# Patient Record
Sex: FEMALE | Race: WHITE
[De-identification: names, ages, dates, MRNs, and addresses within clinical notes are randomized per-mention and may not be internally consistent; named-entity substitution may affect disease eponyms.]

---

## 2020-06-13 ENCOUNTER — HOSPITAL ENCOUNTER (INPATIENT)
Dept: HOSPITAL 96 - M.ERS | Age: 80
LOS: 5 days | Discharge: HOME HEALTH | DRG: 392 | End: 2020-06-18
Attending: FAMILY MEDICINE | Admitting: FAMILY MEDICINE
Payer: MEDICARE

## 2020-06-13 VITALS — DIASTOLIC BLOOD PRESSURE: 85 MMHG | SYSTOLIC BLOOD PRESSURE: 144 MMHG

## 2020-06-13 VITALS — BODY MASS INDEX: 34.54 KG/M2 | WEIGHT: 194.9 LBS | HEIGHT: 62.99 IN

## 2020-06-13 VITALS — DIASTOLIC BLOOD PRESSURE: 77 MMHG | SYSTOLIC BLOOD PRESSURE: 156 MMHG

## 2020-06-13 VITALS — DIASTOLIC BLOOD PRESSURE: 71 MMHG | SYSTOLIC BLOOD PRESSURE: 156 MMHG

## 2020-06-13 VITALS — SYSTOLIC BLOOD PRESSURE: 162 MMHG | DIASTOLIC BLOOD PRESSURE: 71 MMHG

## 2020-06-13 DIAGNOSIS — Z88.1: ICD-10-CM

## 2020-06-13 DIAGNOSIS — Z88.8: ICD-10-CM

## 2020-06-13 DIAGNOSIS — K59.00: ICD-10-CM

## 2020-06-13 DIAGNOSIS — E86.0: ICD-10-CM

## 2020-06-13 DIAGNOSIS — Z79.82: ICD-10-CM

## 2020-06-13 DIAGNOSIS — E78.5: ICD-10-CM

## 2020-06-13 DIAGNOSIS — B96.1: ICD-10-CM

## 2020-06-13 DIAGNOSIS — E11.9: ICD-10-CM

## 2020-06-13 DIAGNOSIS — E03.9: ICD-10-CM

## 2020-06-13 DIAGNOSIS — I25.10: ICD-10-CM

## 2020-06-13 DIAGNOSIS — Z79.84: ICD-10-CM

## 2020-06-13 DIAGNOSIS — I10: ICD-10-CM

## 2020-06-13 DIAGNOSIS — K58.0: Primary | ICD-10-CM

## 2020-06-13 DIAGNOSIS — N39.0: ICD-10-CM

## 2020-06-13 DIAGNOSIS — K42.9: ICD-10-CM

## 2020-06-13 DIAGNOSIS — Z88.0: ICD-10-CM

## 2020-06-13 DIAGNOSIS — Z90.49: ICD-10-CM

## 2020-06-13 DIAGNOSIS — Z79.899: ICD-10-CM

## 2020-06-13 LAB
ABSOLUTE BASOPHILS: 0.1 THOU/UL (ref 0–0.2)
ABSOLUTE EOSINOPHILS: 0.2 THOU/UL (ref 0–0.7)
ABSOLUTE MONOCYTES: 0.7 THOU/UL (ref 0–1.2)
ALBUMIN SERPL-MCNC: 3.5 G/DL (ref 3.4–5)
ALP SERPL-CCNC: 135 U/L (ref 46–116)
ALT SERPL-CCNC: 25 U/L (ref 30–65)
ANION GAP SERPL CALC-SCNC: 10 MMOL/L (ref 7–16)
APTT BLD: 26.4 SECONDS (ref 25–31.3)
AST SERPL-CCNC: 23 U/L (ref 15–37)
BASOPHILS NFR BLD AUTO: 0.8 %
BILIRUB SERPL-MCNC: 0.8 MG/DL
BUN SERPL-MCNC: 9 MG/DL (ref 7–18)
CALCIUM SERPL-MCNC: 9.6 MG/DL (ref 8.5–10.1)
CHLORIDE SERPL-SCNC: 103 MMOL/L (ref 98–107)
CO2 SERPL-SCNC: 26 MMOL/L (ref 21–32)
CREAT SERPL-MCNC: 1 MG/DL (ref 0.6–1.3)
EOSINOPHIL NFR BLD: 1.7 %
GLUCOSE SERPL-MCNC: 221 MG/DL (ref 70–99)
GRANULOCYTES NFR BLD MANUAL: 76.5 %
HCT VFR BLD CALC: 33.5 % (ref 37–47)
HGB BLD-MCNC: 11.7 GM/DL (ref 12–15)
INR PPP: 1.1
LYMPHOCYTES # BLD: 1.5 THOU/UL (ref 0.8–5.3)
LYMPHOCYTES NFR BLD AUTO: 14.2 %
MCH RBC QN AUTO: 30.1 PG (ref 26–34)
MCHC RBC AUTO-ENTMCNC: 34.9 G/DL (ref 28–37)
MCV RBC: 86.3 FL (ref 80–100)
MONOCYTES NFR BLD: 6.8 %
MPV: 8.4 FL. (ref 7.2–11.1)
NEUTROPHILS # BLD: 8 THOU/UL (ref 1.6–8.1)
NT-PRO BRAIN NAT PEPTIDE: 406 PG/ML (ref ?–300)
NUCLEATED RBCS: 0 /100WBC
PLATELET COUNT*: 212 THOU/UL (ref 150–400)
POTASSIUM SERPL-SCNC: 4.1 MMOL/L (ref 3.5–5.1)
PROT SERPL-MCNC: 6.8 G/DL (ref 6.4–8.2)
PROTHROMBIN TIME: 10.8 SECONDS (ref 9.2–11.5)
RBC # BLD AUTO: 3.89 MIL/UL (ref 4.2–5)
RDW-CV: 15.2 % (ref 10.5–14.5)
SODIUM SERPL-SCNC: 139 MMOL/L (ref 136–145)
WBC # BLD AUTO: 10.5 THOU/UL (ref 4–11)

## 2020-06-14 VITALS — SYSTOLIC BLOOD PRESSURE: 128 MMHG | DIASTOLIC BLOOD PRESSURE: 59 MMHG

## 2020-06-14 VITALS — SYSTOLIC BLOOD PRESSURE: 141 MMHG | DIASTOLIC BLOOD PRESSURE: 61 MMHG

## 2020-06-14 VITALS — DIASTOLIC BLOOD PRESSURE: 59 MMHG | SYSTOLIC BLOOD PRESSURE: 152 MMHG

## 2020-06-14 VITALS — DIASTOLIC BLOOD PRESSURE: 58 MMHG | SYSTOLIC BLOOD PRESSURE: 151 MMHG

## 2020-06-14 VITALS — DIASTOLIC BLOOD PRESSURE: 61 MMHG | SYSTOLIC BLOOD PRESSURE: 149 MMHG

## 2020-06-14 LAB
ALBUMIN SERPL-MCNC: 2.8 G/DL (ref 3.4–5)
ANION GAP SERPL CALC-SCNC: 8 MMOL/L (ref 7–16)
BUN SERPL-MCNC: 9 MG/DL (ref 7–18)
CALCIUM SERPL-MCNC: 9.2 MG/DL (ref 8.5–10.1)
CHLORIDE SERPL-SCNC: 107 MMOL/L (ref 98–107)
CO2 SERPL-SCNC: 25 MMOL/L (ref 21–32)
CREAT SERPL-MCNC: 0.9 MG/DL (ref 0.6–1.3)
GLUCOSE SERPL-MCNC: 83 MG/DL (ref 70–99)
HCT VFR BLD CALC: 30.4 % (ref 37–47)
HGB BLD-MCNC: 10.5 GM/DL (ref 12–15)
MAGNESIUM SERPL-MCNC: 1.9 MG/DL (ref 1.8–2.4)
MCH RBC QN AUTO: 29.8 PG (ref 26–34)
MCHC RBC AUTO-ENTMCNC: 34.6 G/DL (ref 28–37)
MCV RBC: 86.2 FL (ref 80–100)
MPV: 8.9 FL. (ref 7.2–11.1)
PHOSPHATE SERPL-MCNC: 2.9 MG/DL (ref 2.5–4.9)
PLATELET COUNT*: 205 THOU/UL (ref 150–400)
POTASSIUM SERPL-SCNC: 3.7 MMOL/L (ref 3.5–5.1)
RBC # BLD AUTO: 3.52 MIL/UL (ref 4.2–5)
RDW-CV: 15.2 % (ref 10.5–14.5)
SODIUM SERPL-SCNC: 140 MMOL/L (ref 136–145)
WBC # BLD AUTO: 9.3 THOU/UL (ref 4–11)

## 2020-06-14 NOTE — NUR
PT UP TO [facility] SEVERAL TIME COMPLAINING OF "SWOLLEN BOWEL PAIN". PT TOLERATED
CLEAR LIQUID DIET. WILL CONTINUE TO ASSESS.

## 2020-06-14 NOTE — NUR
PT ALERT, ORIENTED, SLOW RESPONCE, FORGETFUL. INITAL ASSESMENT PT NOTED TO
HAVE TREMORS. TELEMETRY SHOWS SR. TREMORS INTERFERE WITH TELEMETRY. UP TO BSC
WITH ASSIST OF TWO 5 TIMES. NO BM SINCE ADMISSION. BUTTOCKS SLIGHTLY RED
BLANCHABLE. PT REQUESTED ANXIETY MEDS AND SLEEPING MEDICATION. XANAX AND
MELATONIN GIVEN. PT CALLING OUT Q 15 MIN FOR VARIOUS THINGS UNTIL ASLEEP. WCTM

## 2020-06-14 NOTE — NUR
WORKED WITH PT THROUGHOUT THE DAY GETTING UP TO [facility] AND AMBULATING. BY THE
END OF THE SHIFT PT WAS ABLE TO GET TO BEDSIDE KAMODE BY HERSELF AS WELL AS
WIPE HER BOTTOM AND AMBULATE TO THE SINK TO WASH HER HANDS BY HERSELF. PT
NEEDED
MOSTLY ENCOURAGEMENT. PT PROGRESSING TOWARDS GOALS.

## 2020-06-15 VITALS — SYSTOLIC BLOOD PRESSURE: 163 MMHG | DIASTOLIC BLOOD PRESSURE: 75 MMHG

## 2020-06-15 VITALS — DIASTOLIC BLOOD PRESSURE: 67 MMHG | SYSTOLIC BLOOD PRESSURE: 167 MMHG

## 2020-06-15 VITALS — DIASTOLIC BLOOD PRESSURE: 71 MMHG | SYSTOLIC BLOOD PRESSURE: 163 MMHG

## 2020-06-15 VITALS — DIASTOLIC BLOOD PRESSURE: 70 MMHG | SYSTOLIC BLOOD PRESSURE: 165 MMHG

## 2020-06-15 VITALS — DIASTOLIC BLOOD PRESSURE: 68 MMHG | SYSTOLIC BLOOD PRESSURE: 170 MMHG

## 2020-06-15 LAB
BACTERIA-REFLEX: (no result) /HPF
BILIRUB UR-MCNC: NEGATIVE MG/DL
COLOR UR: YELLOW
KETONES UR STRIP-MCNC: NEGATIVE MG/DL
MUCUS: (no result) STRN/LPF
PROT UR QL STRIP: NEGATIVE
RBC # UR STRIP: (no result) /UL
RBC #/AREA URNS HPF: (no result) /HPF (ref 0–2)
SP GR UR STRIP: 1.01 (ref 1–1.03)
SQUAMOUS: (no result) /LPF (ref 0–3)
URINE CLARITY: (no result)
URINE GLUCOSE-RANDOM: NEGATIVE
URINE LEUKOCYTES-REFLEX: (no result)
URINE NITRITE-REFLEX: NEGATIVE
URINE WBC-REFLEX: (no result) /HPF (ref 0–5)
UROBILINOGEN UR STRIP-ACNC: 0.2 E.U./DL (ref 0.2–1)

## 2020-06-15 NOTE — NUR
CM spoke with Pt via phone. Pt resides at home with her dtr. Pt reports
getting weak over the past few days. Pt states that she is normally
independent, but states that her dtr has been having to help her with ADLs. Pt
completes IADLs. Pt is cdiff pending. Pt has a walker and cane at home for
mobility. Per , Pt has cdiff and parasite test pending. Therapies ordered.
OT recommending acute rehab, CM to await PT recs. Pt in agreement that she
will need either HH vs rehab at [place]. Following.

## 2020-06-15 NOTE — EKG
[place] [address]
Phone:  [phone]                     ELECTROCARDIOGRAM REPORT      
_______________________________________________________________________________
 
Name:         MIGUEL JACKSON             Room:          [address]    ADM IN 
M.R.#:    [id number]     Account #:     [id number]  
Admission:    20    Attend Phys:   Alicja leslie Sa
Discharge:                Date of Birth: [birth year]  
Date of Service: 20 1726  Report #:      [id number]
        [id number]
_______________________________________________________________________________
THIS REPORT FOR:  //name//                      
 
                         [facility] ED
                                       
Test Date:    2020               Test Time:    17:26:22
Pat Name:     MIGUEL JACKSON          Department:   
Patient ID:   [id number]            Room:         [facility]
Gender:       F                        Technician:   MARKIE
:          1940               Requested By: Vernon Pop
Order Number: [id number] MD:   Corona Dickens
                                 Measurements
Intervals                              Axis          
Rate:         80                       P:            
AZ:                                    QRS:          -52
QRSD:         116                      T:            79
QT:           360                                    
QTc:          416                                    
                           Interpretive Statements
sinus rhythm
Left anterior fascicular block
Left ventricular hypertrophy
 
Compared to ECG 2013 02:23:02
no change
 
Electronically Signed On 6- 14:50:08 CDT by Corona Dickens
[email or web address]
 
 
 
 
 
 
 
 
 
 
 
 
 
 
 
 
 
  <ELECTRONICALLY SIGNED>
                                           By: Corona Dickens MD, [facility]      
  06/15/20     1450
D: 20 1726   _____________________________________
T: 20 1726   Corona Dickens MD, [facility]        /EPI

## 2020-06-16 VITALS — DIASTOLIC BLOOD PRESSURE: 77 MMHG | SYSTOLIC BLOOD PRESSURE: 178 MMHG

## 2020-06-16 VITALS — SYSTOLIC BLOOD PRESSURE: 148 MMHG | DIASTOLIC BLOOD PRESSURE: 65 MMHG

## 2020-06-16 VITALS — SYSTOLIC BLOOD PRESSURE: 150 MMHG | DIASTOLIC BLOOD PRESSURE: 69 MMHG

## 2020-06-16 VITALS — DIASTOLIC BLOOD PRESSURE: 86 MMHG | SYSTOLIC BLOOD PRESSURE: 175 MMHG

## 2020-06-16 VITALS — DIASTOLIC BLOOD PRESSURE: 63 MMHG | SYSTOLIC BLOOD PRESSURE: 149 MMHG

## 2020-06-16 VITALS — SYSTOLIC BLOOD PRESSURE: 158 MMHG | DIASTOLIC BLOOD PRESSURE: 73 MMHG

## 2020-06-16 LAB
ALBUMIN SERPL-MCNC: 3.3 G/DL (ref 3.4–5)
ANION GAP SERPL CALC-SCNC: 12 MMOL/L (ref 7–16)
BUN SERPL-MCNC: 5 MG/DL (ref 7–18)
CALCIUM SERPL-MCNC: 9.6 MG/DL (ref 8.5–10.1)
CHLORIDE SERPL-SCNC: 103 MMOL/L (ref 98–107)
CO2 SERPL-SCNC: 22 MMOL/L (ref 21–32)
CREAT SERPL-MCNC: 0.8 MG/DL (ref 0.6–1.3)
GLUCOSE SERPL-MCNC: 122 MG/DL (ref 70–99)
HCT VFR BLD CALC: 34.5 % (ref 37–47)
HGB BLD-MCNC: 11.9 GM/DL (ref 12–15)
MAGNESIUM SERPL-MCNC: 1.9 MG/DL (ref 1.8–2.4)
MCH RBC QN AUTO: 29.8 PG (ref 26–34)
MCHC RBC AUTO-ENTMCNC: 34.6 G/DL (ref 28–37)
MCV RBC: 86.1 FL (ref 80–100)
MPV: 9 FL. (ref 7.2–11.1)
PHOSPHATE SERPL-MCNC: 3 MG/DL (ref 2.5–4.9)
PLATELET COUNT*: 240 THOU/UL (ref 150–400)
POTASSIUM SERPL-SCNC: 3.2 MMOL/L (ref 3.5–5.1)
RBC # BLD AUTO: 4.01 MIL/UL (ref 4.2–5)
RDW-CV: 15.3 % (ref 10.5–14.5)
SODIUM SERPL-SCNC: 137 MMOL/L (ref 136–145)
WBC # BLD AUTO: 10.7 THOU/UL (ref 4–11)

## 2020-06-17 VITALS — SYSTOLIC BLOOD PRESSURE: 158 MMHG | DIASTOLIC BLOOD PRESSURE: 77 MMHG

## 2020-06-17 VITALS — SYSTOLIC BLOOD PRESSURE: 153 MMHG | DIASTOLIC BLOOD PRESSURE: 61 MMHG

## 2020-06-17 VITALS — DIASTOLIC BLOOD PRESSURE: 49 MMHG | SYSTOLIC BLOOD PRESSURE: 139 MMHG

## 2020-06-17 VITALS — SYSTOLIC BLOOD PRESSURE: 160 MMHG | DIASTOLIC BLOOD PRESSURE: 78 MMHG

## 2020-06-17 VITALS — SYSTOLIC BLOOD PRESSURE: 111 MMHG | DIASTOLIC BLOOD PRESSURE: 75 MMHG

## 2020-06-17 LAB
ALBUMIN SERPL-MCNC: 2.9 G/DL (ref 3.4–5)
ANION GAP SERPL CALC-SCNC: 10 MMOL/L (ref 7–16)
ANION GAP SERPL CALC-SCNC: 9 MMOL/L (ref 7–16)
BUN SERPL-MCNC: 4 MG/DL (ref 7–18)
BUN SERPL-MCNC: 5 MG/DL (ref 7–18)
CALCIUM SERPL-MCNC: 8.8 MG/DL (ref 8.5–10.1)
CALCIUM SERPL-MCNC: 8.9 MG/DL (ref 8.5–10.1)
CHLORIDE SERPL-SCNC: 104 MMOL/L (ref 98–107)
CHLORIDE SERPL-SCNC: 105 MMOL/L (ref 98–107)
CO2 SERPL-SCNC: 25 MMOL/L (ref 21–32)
CO2 SERPL-SCNC: 25 MMOL/L (ref 21–32)
CREAT SERPL-MCNC: 0.9 MG/DL (ref 0.6–1.3)
CREAT SERPL-MCNC: 0.9 MG/DL (ref 0.6–1.3)
GLUCOSE SERPL-MCNC: 129 MG/DL (ref 70–99)
GLUCOSE SERPL-MCNC: 131 MG/DL (ref 70–99)
HCT VFR BLD CALC: 32.1 % (ref 37–47)
HGB BLD-MCNC: 11.1 GM/DL (ref 12–15)
MAGNESIUM SERPL-MCNC: 1.6 MG/DL (ref 1.8–2.4)
MCH RBC QN AUTO: 29.7 PG (ref 26–34)
MCHC RBC AUTO-ENTMCNC: 34.5 G/DL (ref 28–37)
MCV RBC: 86.2 FL (ref 80–100)
MPV: 9 FL. (ref 7.2–11.1)
PHOSPHATE SERPL-MCNC: 2.7 MG/DL (ref 2.5–4.9)
PLATELET COUNT*: 202 THOU/UL (ref 150–400)
POTASSIUM SERPL-SCNC: 3 MMOL/L (ref 3.5–5.1)
POTASSIUM SERPL-SCNC: 3 MMOL/L (ref 3.5–5.1)
RBC # BLD AUTO: 3.72 MIL/UL (ref 4.2–5)
RDW-CV: 15.3 % (ref 10.5–14.5)
SODIUM SERPL-SCNC: 139 MMOL/L (ref 136–145)
SODIUM SERPL-SCNC: 139 MMOL/L (ref 136–145)
WBC # BLD AUTO: 8 THOU/UL (ref 4–11)

## 2020-06-17 NOTE — EKG
[place] [address]
Phone:  [phone]                     ELECTROCARDIOGRAM REPORT      
_______________________________________________________________________________
 
Name:         LEATHA JACKSONARET LAVONNE             Room:          [address]    ADM IN 
M.R.#:    [id number]     Account #:     [id number]  
Admission:    20    Attend Phys:   Alicja leslie Sa
Discharge:                Date of Birth: [birth year]  
Date of Service: 20 1508  Report #:      [id number]
        [id number]
_______________________________________________________________________________
THIS REPORT FOR:  //name//                      
 
                          [facility]
                                       
Test Date:    2020               Test Time:    15:08:46
Pat Name:     MIGUEL JACKSON          Department:   
Patient ID:   [id number]            Room:         [address]
Gender:       F                        Technician:   DAMON
:          1940               Requested By: Alicja Tucker
Order Number: [id number]    Reading MD:   Corona Dickens
                                 Measurements
Intervals                              Axis          
Rate:         53                       P:            0
WY:           194                      QRS:          -45
QRSD:         119                      T:            81
QT:           435                                    
QTc:          409                                    
                           Interpretive Statements
Sinus bradycardia
LVH with IVCD, LAD and secondary repol abnrm
Compared to ECG 2020 17:26:22
 
 
no change
 
Electronically Signed On 2020 16:37:08 CDT by Corona Dickens
[email or web address]
 
 
 
 
 
 
 
 
 
 
 
 
 
 
 
 
 
  <ELECTRONICALLY SIGNED>
                                           By: Corona Dickens MD, [facility]      
  20     1637
D: 20 1508   _____________________________________
T: 20 1508   Corona Dickens MD, [facility]        /EPI

## 2020-06-17 NOTE — NUR
NO ACUTE CHANGES THROUGHOUT SHIFT, PT STARTED ON GOLYTELY AND HAS ONLY
FINISHED APPROXIMATELY 170 ML. EDUCATED PT ON WHY GOLYTELY IS NECESSARY FOR
PROCEDURE TOMORROW, PT STATES HER STOMACH FEELS UNCOMFORTABLE AND SHE'S HAVING
A HARD TIME DRINKING ANYMORE THAN THAT. MEDS PER MAR, HOURLY ROUNDING
OBSERVED, FALL PRECAUTIONS IN PLACE, CALL LIGHT W/IN REACH, WILL CONTINUE POC.

## 2020-06-17 NOTE — NUR
ASSUMED PT CARE AT 0730, PT RESTING IN BED, A&O AND C/O NO PAIN OR SHORTNESS
OF BREATH, JUST SOME ABD PAIN R/T WHAT PT BELIEVES TO BE CONSTIPATION. PT
ENDED UP HAVING MULTIPLE LOOSE WATERY STOOLS TODAY. PT REFUSES FLUIDS AND
POTASSIUM OR MAGNESIUM IN IV BECAUSE SHE STATES IT BURNS, ENCOURAGED HER TO
TAKE ORAL POTASSIUM AND MAG W/ SUCCESS AND DRINK MORE FLUIDS. PT CONSULTED W/
GI TODAY ABOUT EGD/COLON TOMORROW MORNING, WILL BE NPO AFTER MIDNIGHT AND IS
ON CLEAR LIQUID DIET NOW. PT GOAL IS TO START GOLYTELY AND CONTINUE HAVING BMS
UNTIL CLEAR. AM ASSESSMENT AS CHARTED, MEDS PER MAR, HOURLY ROUNDING OBSERVED,
FALL PRECAUTIONS IN PLACE, CALL LIGHT W/IN REACH, WILL CONTINUE POC.

## 2020-06-18 VITALS — DIASTOLIC BLOOD PRESSURE: 61 MMHG | SYSTOLIC BLOOD PRESSURE: 138 MMHG

## 2020-06-18 VITALS — SYSTOLIC BLOOD PRESSURE: 156 MMHG | DIASTOLIC BLOOD PRESSURE: 84 MMHG

## 2020-06-18 VITALS — DIASTOLIC BLOOD PRESSURE: 84 MMHG | SYSTOLIC BLOOD PRESSURE: 156 MMHG

## 2020-06-18 NOTE — NUR
PT ESCORTED VIA WHEELCHAIR AT 2100. PT PICKED UP BY DAUGHTER. PT IV OUT AND
CARDIAC MONITOR REMOVED. PT VOICED NO CONCERNS/QUESTIONS REGARDING DISCHARGE
PAPERWORK. ALL BELONGINGS SENT WITH PATIENT.

## 2020-06-18 NOTE — NUR
ASSUMED PT CARE AT 0730, PT SITTING ON BEDSIDE COMODE AND HAS NO C/O PAIN. PT
BROUGHT DOWN FOR EGD/COLON AT APPROX 1000 BUT PROCEDURE WAS NOT SUCCESSFUL DUE
TO PT ONLY DRINKING 4 CUPS OF THE BOWEL PREP. PT SPOKE W/ DR, SEE NOTES. PT TO
DC TODAY AND FOLLOW UP W/ GI OUTPT. SINCE SHE REFUSED TO DRINK MORE BOWEL PREP
AND TRY FOR ANOTHER EGD/COLON DURING THIS HOSPITAL STAY. SP0KE W/ PT'S
DAUGHTER WHO HAD SOME COMPLAINTS ABOUT TAKING CARE OF PT, CM CONSULTED AND
HOME HEALTH ORDERED FOR PT TO HELP W/ CARE AT HOME. PT C/O SOME ANXIETY AFTER
ATTEMPTED PROCEDURE, PRN XANAX GIVEN W/ RELIEF. AM ASSESSMENT AS CHARTED, MEDS
PER MAR, HOURLY ROUNDING OBSERVED, FALL PRECAUTIONS IN PLACE, CALL LIGHT W/IN
REACH, WILL CONTINUE POC.

## 2020-07-05 NOTE — CON
[address]  [address]                    CONSULTATION                  
_______________________________________________________________________________
 
Name:       MIGUEL JACKSON              Room:           [address] [place]  
..#:  [id number]      Account #:      [id number]  
Admission:  06/13/20     Attend Phys:    Alicja Wagner
Discharge:  06/18/20     Date of Birth:  [birth year]  
         Report #: [id number]
                                                                     [id number]  
_______________________________________________________________________________
THIS REPORT FOR:  //name//                      
 
cc:  ESA Alaniz family physician/PCP 
     ESA Alaniz family physician/PCP                                        ~
THIS REPORT FOR:  //name//                      
 
CC: ESA physician/PCP
    Alicja Tucker
 
DATE OF SERVICE:  06/17/2020
 
 
HISTORY OF PRESENT ILLNESS:  This is a pleasant 79-year-old female with past
medical history significant for type 2 diabetes, hypothyroidism and
hypertension, who was presenting for evaluation of chronic diarrhea.  The
patient reports she has had chronic diarrhea for several weeks and diarrhea has
been present on and off for the last 2 years.  The patient reports being
admitted in [facility] a few weeks back for dehydration.  She claims she was
discharged without any specific medication.  The patient reports abdominal
bloating, generalized discomfort in the epigastric and suprapubic regions.  She
reports having 4-5 bowel movements per day.  The stools are usually loose and
watery.  She denies seeing any blood.  The patient denies any weight loss,
hematemesis or hematochezia.  She denies any recent travel, sick contacts or
recent antibiotic use.
 
PAST MEDICAL HISTORY:  Hypertension, diabetes, hyperlipidemia, hypothyroidism.
 
PAST SURGICAL HISTORY:  Appendectomy, cholecystectomy.
 
SOCIAL HISTORY:  The patient denies smoking, alcohol or recreational drug use.
 
FAMILY HISTORY:  There is no family history of colon cancer or Hyatt related
neoplasia.  The patient never had a colonoscopy before.
 
REVIEW OF SYSTEMS:  A comprehensive 10-point review of systems is negative
except for what was mentioned in the HPI.
 
PHYSICAL EXAMINATION:
VITAL SIGNS:  Temperature 36.7, pulse rate 51, respirations 20, blood pressure
158/77, pulse ox 98%.
GENERAL:  The patient is alert, awake, oriented x 3.
HEENT:  Pupils are equal, round, reactive to light and accommodation.  Mucous
membranes are moist.  There is no congestion.
LUNGS:  Clear to auscultation bilaterally.
CARDIOVASCULAR:  Rate and rhythm regular, S1, S2 present.
ABDOMEN:  Soft.  There is no distention, guarding or rigidity.
 
 
 
[place] [address]                    CONSULTATION                  
_______________________________________________________________________________
 
Name:       MIGUEL JACKSON              Room:           [address]#:  [id number]      Account #:      [id number]  
Admission:  06/13/20     Attend Phys:    Alicja leslie [facility]
Discharge:  06/18/20     Date of Birth:  [birth year]  
         Report #: [id number]
                                                                     [id number]  
_______________________________________________________________________________
EXTREMITIES:  Warm, well perfused.  There is no edema.
SKIN:  Warm and dry.
 
LABORATORY DATA:  Sodium 139, potassium 3.0, chloride 105, bicarbonate 25, BUN
5, creatinine 0.9, albumin 2.9.  C. diff negative.
 
IMAGING:  CT abdomen and pelvis, no acute abnormalities identified.  Bilateral
renal cortical cysts
small fat containing umbilical hernia.  Small amount of air
in the urinary bladder secondary to recent catheterization.
 
ASSESSMENT AND PLAN:  Pleasant 79-year-old female presenting with chronic
diarrhea.  C. diff has been tested and is negative.  We will proceed with EGD
and colonoscopy tomorrow and make further recommendations based on the
procedures.
 
 
 
 
 
 
 
 
 
 
 
 
 
 
 
 
 
 
 
 
 
 
 
 
 
 
 
 
 
<ELECTRONICALLY SIGNED>
                                        By:  Colt Jamison MD         
07/05/20     0828
D: 06/17/20 1040_______________________________________
T: 06/17/20 1325Colt Jamison MD            /nt